# Patient Record
(demographics unavailable — no encounter records)

---

## 2025-01-09 NOTE — ASSESSMENT
[FreeTextEntry1] : Ms. KHAN is a 48-year-old female originally from [place] with a history of influenza (12/26/2023), COVID-19 1/2023, ?migraines, melanoma s/p excision in the RUE 2021, asthma as a child who now comes to the office for an initial pulmonary evaluation for SOB, severe persistent asthma, allergies, snoring ?AUREA   Her shortness of breath is multifactorial due to: -poor mechanics of breathing -out of shape -pulmonary disease   -severe persistent asthma -cardiac disease    -doubtful   Problem 1: severe persistent asthma- active 1/2025 -add Breztri 2 puffs BID, gargle and spit after using  -add Airsupra 2 inhalations Q12H PRN  -Asthma is believed to be caused by inherited (genetic) and environmental factor, but its exact cause is unknown. Asthma may be triggered by allergens, lung infections, or irritants in the air. Asthma triggers are different for each person.  -Inhaler technique reviewed as well as oral hygiene techniques reviewed with patient. Avoidance of cold air, extremes of temperature, rescue inhaler should be used before exercise. Order of medication reviewed with patient. Recommended adequate hydration and use of a cool mist humidifier in the bedroom    Problem 2: allergies/sinus -complete blood work: asthma panel, food IgE panel, IgE level, eosinophil level, vitamin D level  -Environmental measures for allergies were encouraged including mattress and pillow covers, air purifier, and environmental controls.    Problem 3: ?AUREA (elevated Mallampati class, snoring) -complete home sleep study -Sleep apnea is associated with adverse clinical consequences which can affect most organ systems. Cardiovascular disease risk includes arrhythmias, atrial fibrillation, hypertension, coronary artery disease, and stroke. Metabolic disorders include diabetes type 2, non-alcoholic fatty liver disease. Mood disorder especially depression; and cognitive decline especially in the elderly. Associations with chronic reflux/Allan's esophagus some but not all inclusive. -Reasons include arousal consistent with hypopnea; respiratory events most prominent in REM sleep or supine position; therefore sleep staging and body position are important for accurate diagnosis and estimation of AHI.    Problem 4: cardiac disease -recommended to follow up with Cardiologist if needed   Problem 5: poor breathing mechanics -Recommended Areli Tierney and Butshala breathing technique -Proper breathing techniques were reviewed with an emphasis on exhalation. Patient was instructed to breathe in for 1 second and out for 4 seconds. The patient was encouraged to not talk while walking.   Problem 6: out of shape -Weight loss, exercise, and diet control were discussed and are highly encouraged. Treatment options are given such as aqua therapy, and contacting a nutritionist. Recommended to use the elliptical, stationary bike, less use of the treadmill.   Problem 7: health maintenance -recommended yearly flu shot after October 15, 2024 -recommended strep pneumonia vaccines: Prevnar-20 vaccine after the age of 65 -recommended early intervention for Upper Respiratory Infections (URIs) -recommended regular osteoporosis evaluations -recommended early dermatological evaluations -recommended after the age of 50 to the age of 70, colonoscopy every 5 years   F/P in 6-8 weeks. She is encouraged to call with any changes, concerns, or questions

## 2025-01-09 NOTE — PROCEDURE
[FreeTextEntry1] : CXR (01/09/2025) reveals a normal sized heart; no evidence of infiltrate or effusion--a normal appearing chest radiograph   Full PFT reveals mild obstructive dysfunction at mid-low lung volumes; FEV1 was 2.02L which is 78% of predicted, with a 5% improvement on BD; hyperinflation; normal diffusion at 19.37, which is 96% of predicted; normal flow volume loop.  PFTs were performed to evaluate for asthma  6 minute walk test reveals a low saturation of 93% with no evidence of dyspnea or fatigue; walked 489 meters   FENO was 105; a normal value being less than 25 Fractional exhaled nitric oxide (FENO) is regarded as a simple, noninvasive method for assessing eosinophilic airway inflammation. Produced by a variety of cells within the lung, nitric oxide (NO) concentrations are generally low in healthy individuals. However, high concentrations of NO appear to be involved in nonspecific host defense mechanisms and chronic inflammatory diseases such as asthma. The American Thoracic Society (ATS) therefore has recommended using FENO to aid in the diagnosis and monitoring of eosinophilic airway inflammation and asthma, and for identifying steroid responsive individuals whose chronic respiratory symptoms may be caused by airway inflammation.

## 2025-01-09 NOTE — HISTORY OF PRESENT ILLNESS
[TextBox_4] : Ms. KHAN is a 48-year-old female originally from [place] with a history of influenza (12/26/2023), COVID-19 1/2023, ?migraines, melanoma s/p excision in the RUE 2021, asthma as a child presenting to the office today for an initial pulmonary evaluation. Her chief complaint is  -she notes the flu (12/2023) was worse for her than COVID-19 (1/2023) -she notes childhood asthma as a kid, triggered by dust and pollen -she notes her asthma resolved with the onset of menstruation, though she thinks it's now active after she had the flu in 12/2023 -she notes she was prescribed an ABx when she had the flu. The cough improved, then came back -she notes intermittent productive coughing fits -she notes she had a CXR in 3/2024, and no abnormalities were found in her lungs -she notes her cough persisted in the summer. She was told it was allergy related. Zyrtec and Claritin helped slightly -she notes her PCP gave her albuterol, which she doesn't use due to palpitations/tachycardia -she notes she went to her PCP in 10/2024 due to the return of her cough -she notes she coughs worst in the morning, and it subsides as the day progresses -she notes intermittent wheezing -she notes asthma Sx: SOB, coughing, wheezing -she notes asthma is triggered with URIs, humidity (excess rain), pollen allergies -she notes allergies in the Spring (pollen) -she notes she used to have frequent sinus headaches and migraines 1 year ago, which have resolved completely. She thinks her Sx were related to menstruation  -she notes frequent itchy eyes recently, more than usual. She doesn't know why her eyes are bothering her so much given the cold weather and dryness -she notes occasional itchy ears -she notes very mild heartburn and reflux -she notes infrequent globus sensation -she notes good quality of sleep  -she notes waking up well-rested -her  notes she snores -she denies nocturia -she notes her memory and concentration are good -she notes exercising (Pilates twice per week) without limitations -she notes her sleep was interrupted by hot flashes. They started getting bad 9/2024, and stopped in 12/2024. She thinks her sweats were stress-related -she notes she hasn't menstruated in 3 months -she notes she's traveling to [place] in the summer 2025 -she notes her strength is good  -she denies any nausea, emesis, fever, chills, chest pain, chest pressure, constipation, diarrhea, vertigo, dysphagia, leg swelling, leg pain, arthralgias, myalgias, or sour taste in the mouth.

## 2025-01-09 NOTE — PHYSICAL EXAM
[No Acute Distress] : no acute distress [Normal Oropharynx] : normal oropharynx [Normal Appearance] : normal appearance [No Neck Mass] : no neck mass [Normal Rate/Rhythm] : normal rate/rhythm [Normal S1, S2] : normal s1, s2 [No Murmurs] : no murmurs [No Resp Distress] : no resp distress [No Abnormalities] : no abnormalities [Benign] : benign [Normal Gait] : normal gait [No Clubbing] : no clubbing [No Cyanosis] : no cyanosis [No Edema] : no edema [FROM] : FROM [Normal Color/ Pigmentation] : normal color/ pigmentation [No Focal Deficits] : no focal deficits [Oriented x3] : oriented x3 [Normal Affect] : normal affect [III] : Mallampati Class: III [TextBox_68] : I:E ratio 1:3; expiratory wheezes

## 2025-01-09 NOTE — ADDENDUM
[FreeTextEntry1] : Documented by Nettie Noel acting as a scribe for Dr. Dylon Strickland on 01/09/2025. All medical record entries made by the Scribe were at my, Dr. Dylon Strickland's, direction and personally dictated by me on 01/09/2025. I have reviewed the chart and agree that the record accurately reflects my personal performance of the history, physical exam, assessment and plan. I have also personally directed, reviewed, and agree with the discharge instructions.

## 2025-01-09 NOTE — REASON FOR VISIT
[Initial] : an initial visit [TextBox_44] : SOB, severe persistent asthma, allergies, snoring ?AUREA [TextBox_13] : Dr. Guy Beg

## 2025-01-22 NOTE — ASSESSMENT
[FreeTextEntry1] : # Neoplasm of uncertain behavior Location: R forearm DDx: R/o irritated IDN vs DF vs other Biopsy by shave The risks/benefits/alternatives of skin biopsy were explained to the patient, which include and are not limited to bleeding, infection, scarring or discoloration of skin, and recurrence of lesion. Patient expressed understanding of these risks and provided consent to the procedure. Time out with verification of patient and lesion site was performed. Site was prepped with rubbing alcohol, lidocaine with epinephrine was injected for anesthesia, and biopsy was performed. Specimen sent to path.  Procedure was without complication and well tolerated. Wound care was discussed.  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 - I have counseled the patient on the importance of sun protection and monthly self skin exams at home. We have discussed proper sun protection habits and techniques, monthly self-skin exams and the ABCDEs of melanoma. I have asked the patient to notify me of any new or changing lesions. - Sun protection was discussed. The proper use of broad-spectrum UVB/UVA sunscreens with SPF 30 or greater was reviewed and the need for re-application after swimming or sweating or 2-3 hours was emphasized. We talked about judicious use of clothing and avoidance of peak periods of sun exposure. I made the patient aware of the need for year-round protection.  #Irritant dermatitis vs seb derm, b/l nasal ala mostly R side today extending over NLF Chronic, flaring  - START elidel BID, SED  RTC 6 months for skin check, sooner prn

## 2025-01-22 NOTE — PHYSICAL EXAM
[Alert] : alert [Oriented x 3] : ~L oriented x 3 [Well Nourished] : well nourished [Conjunctiva Non-injected] : conjunctiva non-injected [No Visual Lymphadenopathy] : no visual  lymphadenopathy [No Clubbing] : no clubbing [No Edema] : no edema [No Bromhidrosis] : no bromhidrosis [No Chromhidrosis] : no chromhidrosis [FreeTextEntry3] :  AAOx3, NAD, well-appearing / pleasant Total body skin exam performed within normal limits with the exception of:  - Patient deferred examination of genitalia  Light brown papule R forearm Fairly uniform and regular brown and skin-colored macules and papules on the trunk and extremities; several larger uniform papules on face well healed scar on RUE, NER no cervical or axillary LAD b/l

## 2025-01-22 NOTE — HISTORY OF PRESENT ILLNESS
[FreeTextEntry1] : fu - skin check, hx of melanoma [de-identified] : Ms. SUSY KHAN is a 48 year old female who presents to us fu for below  Patient with history of melanoma R upper arm (dx age 44 2/2021, early stage [1A per pt] did not require SLNB), s/p wide excision, being followed by Dr. Quiñones. 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 no night sweats, unintentional weight loss, chills, fevers. Saw ophtho, as she noticed pigment on the conjunctiva, they just think it is pigmentation, no intervention for now. Sees dentist and ob yearly  Has one spot on right forearm she feels is changing  PMH: melanoma PSH: skin excision meds: none family history: father glioblastoma (63), 2 paternal cousins with BRCA gene and breast cancer social: lives with  and 2 daughters (Tana is at [employer] in college; Radha is on the soccer team in high school), denies smoking, occasional drinking. 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

## 2025-02-12 NOTE — ASSESSMENT
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   She travels to a remote part of [place] each summer and she might have been exposed to Strongyloides during one of these trips.   She should have improvement in her eosinophilia in 3-4 months after being treated with Ivermectin - will repeat absolute eosinophil count at that time.

## 2025-02-12 NOTE — PHYSICAL EXAM
[Alert] : alert [Well Nourished] : well nourished [No Acute Distress] : no acute distress [Well Developed] : well developed [Normal Nasal Mucosa] : the nasal mucosa was normal [No Neck Mass] : no neck mass was observed [No LAD] : no lymphadenopathy [Normal Rate and Effort] : normal respiratory rhythm and effort [No Crackles] : no crackles [No Retractions] : no retractions [Normal Rate] : heart rate was normal  [Normal S1, S2] : normal S1 and S2 [Regular Rhythm] : with a regular rhythm [Normal Cervical Lymph Nodes] : cervical [Normal Mood] : mood was normal [Judgment and Insight Age Appropriate] : judgement and insight is age appropriate [Wheezing] : no wheezing was heard

## 2025-02-12 NOTE — SOCIAL HISTORY
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

## 2025-02-12 NOTE — IMPRESSION
[_____] : grasses ([unfilled]) [Allergy Testing Cockroach] : cockroach [Allergy Testing Dog] : dog [] : molds [Allergy Testing Trees] : trees [Allergy Testing Weeds] : weeds

## 2025-02-12 NOTE — HISTORY OF PRESENT ILLNESS
[de-identified] : Patient is a 47 y/o female who had flu like illness 12/23 and she developed a lingering cough and worsening asthma.   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   Patient was treated with ivermectin.  She has been taking Symbicort 160 2 puffs BID on a regular basis.   She has improved dramatically - she has not been using her rescue inhaler.     Patient with asthma as child - remission at age 13 - no recurrent symptoms until 12/23.   Patient born in [place] - moved [place] age 24.   [place] - she travels every summer to this remote location in [place] for about 4 weeks.   Patient with history of sinusitis with URI in the past.   She takes prn Benadryl - during the spring months.   She is taking OTC - cranberry - Vitamin D, E, B and C.

## 2025-04-01 NOTE — PHYSICAL EXAM
[No Acute Distress] : no acute distress [Normal Oropharynx] : normal oropharynx [III] : Mallampati Class: III [Normal Appearance] : normal appearance [No Neck Mass] : no neck mass [Normal Rate/Rhythm] : normal rate/rhythm [Normal S1, S2] : normal s1, s2 [No Murmurs] : no murmurs [No Resp Distress] : no resp distress [No Abnormalities] : no abnormalities [Benign] : benign [Normal Gait] : normal gait [No Clubbing] : no clubbing [No Cyanosis] : no cyanosis [No Edema] : no edema [FROM] : FROM [Normal Color/ Pigmentation] : normal color/ pigmentation [No Focal Deficits] : no focal deficits [Oriented x3] : oriented x3 [Normal Affect] : normal affect

## 2025-04-01 NOTE — ASSESSMENT
[FreeTextEntry1] : Ms. KHAN is a 48-year-old female originally from [place] with a history of influenza (12/26/2023), COVID-19 1/2023, ?migraines, melanoma s/p excision in the RUE 2021, asthma as a child who now comes to the office for a follow up visit for SOB, severe persistent asthma, allergies, snoring ?AUREA - stable from a pulmonary standpoint   -  Her shortness of breath is multifactorial due to: --pulmonary disease   -severe persistent asthma -cardiac disease    -doubtful   Problem 1: severe persistent asthma- active 1/2025 -add Breztri 2 puffs BID, gargle and spit after using - switched to Spiriva and Symbicort -add Airsupra 2 inhalations Q12H PRN insurance did not cover switched to Xopenex -Asthma is believed to be caused by inherited (genetic) and environmental factor, but its exact cause is unknown. Asthma may be triggered by allergens, lung infections, or irritants in the air. Asthma triggers are different for each person.   Problem 2: allergies/sinus -continue OTC Claritin -s/p blood work: asthma panel, food IgE panel, IgE level, eosinophil level, vitamin D level  -Environmental measures for allergies were encouraged including mattress and pillow covers, air purifier, and environmental controls.    Problem 3: ?AUREA (elevated Mallampati class, snoring) -complete home sleep study - pending -Sleep apnea is associated with adverse clinical consequences which can affect most organ systems. Cardiovascular disease risk includes arrhythmias, atrial fibrillation, hypertension, coronary artery disease, and stroke. Metabolic disorders include diabetes type 2, non-alcoholic fatty liver disease. Mood disorder especially depression; and cognitive decline especially in the elderly. Associations with chronic reflux/Allan's esophagus some but not all inclusive. -Reasons include arousal consistent with hypopnea; respiratory events most prominent in REM sleep or supine position; therefore sleep staging and body position are important for accurate diagnosis and estimation of AHI.    Problem 4: cardiac disease -recommended to follow up with Cardiologist if needed   Problem 5: weight management -Weight loss, exercise, and diet control were discussed and are highly encouraged. Treatment options are given such as aqua therapy, and contacting a nutritionist. Recommended to use the elliptical, stationary bike, less use of the treadmill.   Problem 6: health maintenance -recommended yearly flu shot after October 15, 2025 -recommended strep pneumonia vaccines: Prevnar-20 vaccine after the age of 65 -recommended early intervention for Upper Respiratory Infections (URIs) -recommended regular osteoporosis evaluations -recommended early dermatological evaluations -recommended after the age of 50 to the age of 70, colonoscopy every 5 years   F/P in 6-8 months She is encouraged to call with any changes, concerns, or questions

## 2025-04-01 NOTE — HISTORY OF PRESENT ILLNESS
[TextBox_4] : Ms. KHAN is a 48-year-old female originally from [place] with a history of influenza (12/26/2023), COVID-19 1/2023, ?migraines, melanoma s/p excision in the RUE 2021, asthma as a child presenting to the office today for a follow up visit. Her chief complaint is  -reports she is in her usual state of health, no new complaints or recent illness -reports she had the flu in January had a residual cough -reports her cough has resolved  -reports Springtime allergies -reports she is being followed by Dr. Boxer -reports good quality sleep -reports her weight and appetite are stable -reports she does Pilates 2x/week and a stationary bike 2x/week  she denies any nausea, emesis, fever, chills, chest pain, chest pressure, constipation, diarrhea, vertigo, dysphagia, leg swelling, leg pain, arthralgias, myalgias, or sour taste in the mouth.

## 2025-05-01 NOTE — ASSESSMENT
[FreeTextEntry1] : Irritant dermatitis vs seb derm, chronic, flaring, worsening  - No improvement with pimecrolimus and tacrolimus ointments.  - Face: Start HCT 2.5% cream mixed with Keto 2% cream BID x 2 weeks. After 2 weeks, stop and use Keto 2% cream daily. Can continue to use HCT cream PRN flares. SED. - Continue with gentle skincare only.  - Wash face daily after inhaler use.   RTC 2 wks.

## 2025-05-01 NOTE — HISTORY OF PRESENT ILLNESS
[FreeTextEntry1] : rpa: dermatitis [de-identified] : 48-year-old female with a history of melanoma on the R upper arm, last seen Jan 2025 who presents to us fu for below  #Irritant dermatitis vs seb derm, b/l nasal ala, now also at the left glabella/lower forehead. Very itchy, worsening. Redness comes and goes. Started Elidel at last visit. Alternates with tacrolimus ointment, notes burning sensation and no relief from itching. Never flakey. No other similar rashes. Uses gentle skincare products (LRP). Uses inhaler once daily several times a week.   History: #Patient with history of melanoma R upper arm (dx age 44 2/2021, early stage [1A per pt] did not require SLNB), s/p wide excision, being followed by Dr. Quiñones. 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 no night sweats, unintentional weight loss, chills, fevers. Saw ophtho, as she noticed pigment on the conjunctiva, they just think it is pigmentation, no intervention for now. Sees dentist and ob yearly   PMH: melanoma PSH: skin excision meds: none family history: father glioblastoma (63), 2 paternal cousins with BRCA gene and breast cancer social: lives with  and 2 daughters (Tana is at [employer] in [place]; Radha is on the soccer team in high school), denies smoking, occasional drinking. 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

## 2025-05-01 NOTE — PHYSICAL EXAM
[Alert] : alert [Oriented x 3] : ~L oriented x 3 [Well Nourished] : well nourished [Conjunctiva Non-injected] : conjunctiva non-injected [No Visual Lymphadenopathy] : no visual  lymphadenopathy [No Clubbing] : no clubbing [No Edema] : no edema [No Chromhidrosis] : no chromhidrosis [No Bromhidrosis] : no bromhidrosis [FreeTextEntry3] : Focused skin exam performed  The relevant portions of the exam were performed today  AAOx3, NAD, well-appearing / pleasant Focused examination within normal limits with the exception of:  Small pink papules with minimal inducible scale coalescing into a small thin plaque at the left glabella/lower median forehead, and right nasolabial fold and right mid cheek

## 2025-05-27 NOTE — HISTORY OF PRESENT ILLNESS
[FreeTextEntry1] : CPE [de-identified] : 48 yr old F with PMH strongyloides treated in 1/2025 , chronic cough/wheeze (childhood in asthma), melanoma with q 6 month skin checks. HEre for CPE.  Feeling well otherwise.  She sees Dr Strickland (pulm),     Dr Boxer (allergy) and Dr Guzman (derm) regularly.

## 2025-05-27 NOTE — HEALTH RISK ASSESSMENT
[Excellent] : ~his/her~  mood as  excellent [No] : In the past 12 months have you used drugs other than those required for medical reasons? No [No falls in past year] : Patient reported no falls in the past year [0] : 2) Feeling down, depressed, or hopeless: Not at all (0) [PHQ-2 Negative - No further assessment needed] : PHQ-2 Negative - No further assessment needed [Audit-CScore] : 0 [de-identified] : pilates, peloton [de-identified] : well balanced  [[id number]] : 0 [Patient reported mammogram was normal] : Patient reported mammogram was normal [Patient reported colonoscopy was normal] : Patient reported colonoscopy was normal [HIV Test offered] : HIV Test offered [Hepatitis C test offered] : Hepatitis C test offered [Change in mental status noted] : No change in mental status noted [With Family] : lives with family [Employed] : employed [] :  [# Of Children ___] : has [unfilled] children [Fully functional (bathing, dressing, toileting, transferring, walking, feeding)] : Fully functional (bathing, dressing, toileting, transferring, walking, feeding) [Fully functional (using the telephone, shopping, preparing meals, housekeeping, doing laundry, using] : Fully functional and needs no help or supervision to perform IADLs (using the telephone, shopping, preparing meals, housekeeping, doing laundry, using transportation, managing medications and managing finances) [Reports changes in hearing] : Reports no changes in hearing [Reports changes in vision] : Reports no changes in vision [Reports normal functional visual acuity (ie: able to read med bottle)] : Reports normal functional visual acuity [Reports changes in dental health] : Reports no changes in dental health [Smoke Detector] : smoke detector [Carbon Monoxide Detector] : carbon monoxide detector [Safety elements used in home] : safety elements used in home [Seat Belt] :  uses seat belt [MammogramDate] : 1/2025 [ColonoscopyDate] : 3/2023 [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

## 2025-06-15 NOTE — ASSESSMENT
[FreeTextEntry1] : 48-year-old lady.  Follow-up of melanoma (0.16 mm) excised March 2021.  3/31/2025: Chest x-ray 450 was normal. Prescription entered for this study to be repeated presently.  If asymptomatic, with normal imaging, we should see her in another year, sooner if needed.

## 2025-06-15 NOTE — HISTORY OF PRESENT ILLNESS
[de-identified] : 48-year-old lady.  Annual melanoma follow-up.  CC:   2021:  Wide excision of a 0.6 mm (T1a) melanoma of her RIGHT ARM (upper, posterior). Reconstruction by Dr. Bo GOMEZ.  Pathology: No residual melanoma. Negative margins.  2021 preoperative imaging: Chest x-ray: Clear lungs. Right axillary sonogram: No adenopathy.   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 This was identified on a multi panel test (Sema 4) submitted by her gynecologist due to the family history of breast cancer (below. No other deleterious mutations identified.  2022: Seen by medical genetics (Dr. George KASPER). The following recommendations were made. 1.  Screening for pancreatic cancer. 2023, she met with Dr. Meghan Augustin for pancreatic cancer surveillance. EUS is scheduled for 2024.... 2.  Skin examinations at least twice a year. 3.  "Age-appropriate" cancer screening for other organ systems.   2021: Referred by her dermatologist at the time, Dr. London Mckeon, with a 0.60 mm (T1a) melanoma of the RIGHT ARM (upper, proximal, posterior). Dermal nevus extends to a depth of 0.82 mm. Pathology was from [facility], .  This was an asymptomatic pigmented lesion which over the summer 2020 had become slightly darker, larger, slightly raised, with a rough surface. She brought it to the attention of her dermatologist at her annual visit 2021. Interval change in appearance led to a biopsy and the above diagnosis.  No prior personal history of melanoma.  No previous personal history of malignancy.  No relatives with skin cancer.   Family history of cancer: + Paternal cousin: Breast cancer at 46 Father: Malignant brain tumor.   Derm: Follows up with our department. May 2025 visit was unremarkable, excision of a right forearm dermatofibroma. She used to see Dr. London Mckeon, and his associate: MELISSA Duncan PA-C, then Dr. Judd Clark.   PMD: Dr. Julieta GARCIA. She used to see Dr. Srinath Diaz.  NKDA. + ALLERGIC: Animal dander (cat) and dust. Allergy and immunology: Dr. Mitchell Boxer.  No pacemaker or defibrillator. No anticoagulants.  No current prescription medications.  + Asthma. Pulmonary medicine: Dr. Dylon POTTER. Uses several inhalers.   Eye examination: 2023. Dr. Pamela ORTEGA, OD: No abnormalities noted none   OB/GYN: [facility], has seen different doctors at different times. 2023 visit was unremarkable  Menarche at 12.  2, para 2, first at 28. Cycles are still regular. No exogenous hormones   Breast imaging: 2024: Bilateral mammogram and sonogram at [facility]: BI-RADS 2. Studies coordinated by her gynecologist + FH: Paternal cousin: Breast cancer at 47.   Baseline colonoscopy: 2023. Okay x5 years. Dr. Rajeev PAREDES

## 2025-06-15 NOTE — PHYSICAL EXAM
[Normal] : supple, no neck mass and thyroid not enlarged [Normal Neck Lymph Nodes] : normal neck lymph nodes  [Normal Supraclavicular Lymph Nodes] : normal supraclavicular lymph nodes [Normal Axillary Lymph Nodes] : normal axillary lymph nodes [Normal] : full range of motion and no deformities appreciated [de-identified] : Groins not examined [de-identified] : Below

## 2025-06-15 NOTE — HISTORY OF PRESENT ILLNESS
[de-identified] : 48-year-old lady.  Annual melanoma follow-up.  CC:   2021:  Wide excision of a 0.6 mm (T1a) melanoma of her RIGHT ARM (upper, posterior). Reconstruction by Dr. Bo GOMEZ.  Pathology: No residual melanoma. Negative margins.  2021 preoperative imaging: Chest x-ray: Clear lungs. Right axillary sonogram: No adenopathy.   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 This was identified on a multi panel test (Sema 4) submitted by her gynecologist due to the family history of breast cancer (below. No other deleterious mutations identified.  2022: Seen by medical genetics (Dr. George KASPER). The following recommendations were made. 1.  Screening for pancreatic cancer. 2023, she met with Dr. Meghan Augustin for pancreatic cancer surveillance. EUS is scheduled for 2024.... 2.  Skin examinations at least twice a year. 3.  "Age-appropriate" cancer screening for other organ systems.   2021: Referred by her dermatologist at the time, Dr. London Mckeon, with a 0.60 mm (T1a) melanoma of the RIGHT ARM (upper, proximal, posterior). Dermal nevus extends to a depth of 0.82 mm. Pathology was from [facility], .  This was an asymptomatic pigmented lesion which over the summer 2020 had become slightly darker, larger, slightly raised, with a rough surface. She brought it to the attention of her dermatologist at her annual visit 2021. Interval change in appearance led to a biopsy and the above diagnosis.  No prior personal history of melanoma.  No previous personal history of malignancy.  No relatives with skin cancer.   Family history of cancer: + Paternal cousin: Breast cancer at 46 Father: Malignant brain tumor.   Derm: Follows up with our department. May 2025 visit was unremarkable, excision of a right forearm dermatofibroma. She used to see Dr. London Mckeon, and his associate: MELISSA Duncan PA-C, then Dr. Judd Clark.   PMD: Dr. Julieta GARCIA. She used to see Dr. Srinath Diaz.  NKDA. + ALLERGIC: Animal dander (cat) and dust. Allergy and immunology: Dr. Mitchell Boxer.  No pacemaker or defibrillator. No anticoagulants.  No current prescription medications.  + Asthma. Pulmonary medicine: Dr. Dylon POTTER. Uses several inhalers.   Eye examination: 2023. Dr. Pamela ORTEGA, OD: No abnormalities noted none   OB/GYN: [facility], has seen different doctors at different times. 2023 visit was unremarkable  Menarche at 12.  2, para 2, first at 28. Cycles are still regular. No exogenous hormones   Breast imaging: 2024: Bilateral mammogram and sonogram at [facility]: BI-RADS 2. Studies coordinated by her gynecologist + FH: Paternal cousin: Breast cancer at 47.   Baseline colonoscopy: 2023. Okay x5 years. Dr. Rajeev PAREDES

## 2025-06-15 NOTE — REVIEW OF SYSTEMS
[Negative] : Heme/Lymph [FreeTextEntry6] :  asthma [FreeTextEntry7] :  environmental allergies [de-identified] :  melanoma

## 2025-06-15 NOTE — REVIEW OF SYSTEMS
[Negative] : Heme/Lymph [FreeTextEntry6] :  asthma [de-identified] :  melanoma [FreeTextEntry7] :  environmental allergies

## 2025-06-15 NOTE — REASON FOR VISIT
[Follow-Up Visit] : a follow-up visit for [Other: _____] : [unfilled] [FreeTextEntry2] : 0.6 mm right arm melanoma excised March 2021.

## 2025-06-15 NOTE — PHYSICAL EXAM
[Normal] : supple, no neck mass and thyroid not enlarged [Normal Neck Lymph Nodes] : normal neck lymph nodes  [Normal Supraclavicular Lymph Nodes] : normal supraclavicular lymph nodes [Normal Axillary Lymph Nodes] : normal axillary lymph nodes [Normal] : full range of motion and no deformities appreciated [de-identified] : Groins not examined [de-identified] : Below

## 2025-07-03 NOTE — HISTORY OF PRESENT ILLNESS
[FreeTextEntry1] : fu - skin check, hx of melanoma [de-identified] : Ms. SUSY KHAN is a 48 year old female who presents to us fu   Patient with history of melanoma R upper arm (dx age 44 2/2021, early stage [1A per pt] did not require SLNB), s/p wide excision, being followed by Dr. Quiñones. 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 no night sweats, unintentional weight loss, chills, fevers. Saw ophtho, as she noticed pigment on the conjunctiva, they just think it is pigmentation, no intervention for now. Sees dentist and ob yearly  Has one spot on left forearm, not sure if changing in color recently Also with rash on face- tacrolimus/pimecrolimus didn't help, keto + hc thought to help initially but now recurred. Uses asthma inhalers with steroids  PMH: melanoma PSH: skin excision meds: none family history: father glioblastoma (63), 2 paternal cousins with BRCA gene and breast cancer social: lives with  and 2 daughters (Tana is at [employer] in [place]; Radha is on the soccer team in high school), denies smoking, occasional drinking. 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

## 2025-07-03 NOTE — HISTORY OF PRESENT ILLNESS
[FreeTextEntry1] : fu - skin check, hx of melanoma [de-identified] : Ms. SUSY KHAN is a 48 year old female who presents to us fu   Patient with history of melanoma R upper arm (dx age 44 2/2021, early stage [1A per pt] did not require SLNB), s/p wide excision, being followed by Dr. Quiñones. 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 no night sweats, unintentional weight loss, chills, fevers. Saw ophtho, as she noticed pigment on the conjunctiva, they just think it is pigmentation, no intervention for now. Sees dentist and ob yearly  Has one spot on left forearm, not sure if changing in color recently Also with rash on face- tacrolimus/pimecrolimus didn't help, keto + hc thought to help initially but now recurred. Uses asthma inhalers with steroids  PMH: melanoma PSH: skin excision meds: none family history: father glioblastoma (63), 2 paternal cousins with BRCA gene and breast cancer social: lives with  and 2 daughters (Tana is at [employer] in [place]; Radha is on the soccer team in high school), denies smoking, occasional drinking. 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

## 2025-07-03 NOTE — PHYSICAL EXAM
[Full Body Skin Exam Performed] : performed [Alert] : alert [Oriented x 3] : ~L oriented x 3 [Well Nourished] : well nourished [Conjunctiva Non-injected] : conjunctiva non-injected [No Visual Lymphadenopathy] : no visual  lymphadenopathy [No Clubbing] : no clubbing [No Edema] : no edema [No Bromhidrosis] : no bromhidrosis [No Chromhidrosis] : no chromhidrosis [FreeTextEntry3] :  AAOx3, NAD, well-appearing / pleasant Total body skin exam performed within normal limits with the exception of:  - Patient deferred examination of genitalia  Pink-brown uniform 0.6x 0.5cm macule left forearm Few pink discrete papules glabella Fairly uniform and regular brown and skin-colored macules and papules on the trunk and extremities; several larger uniform papules on face well healed scar on RUE, NER no cervical or axillary LAD b/l

## 2025-07-03 NOTE — ASSESSMENT
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 - I have counseled the patient on the importance of sun protection and monthly self skin exams at home. We have discussed proper sun protection habits and techniques, monthly self-skin exams and the ABCDEs of melanoma. I have asked the patient to notify me of any new or changing lesions. - Sun protection was discussed. The proper use of broad-spectrum UVB/UVA sunscreens with SPF 30 or greater was reviewed and the need for re-application after swimming or sweating or 2-3 hours was emphasized. We talked about judicious use of clothing and avoidance of peak periods of sun exposure. I made the patient aware of the need for year-round protection.  RTC 3 months